# Patient Record
Sex: FEMALE | Race: WHITE | NOT HISPANIC OR LATINO | Employment: UNEMPLOYED | URBAN - METROPOLITAN AREA
[De-identification: names, ages, dates, MRNs, and addresses within clinical notes are randomized per-mention and may not be internally consistent; named-entity substitution may affect disease eponyms.]

---

## 2022-04-05 ENCOUNTER — APPOINTMENT (OUTPATIENT)
Dept: GENERAL RADIOLOGY | Facility: HOSPITAL | Age: 32
End: 2022-04-05

## 2022-04-05 ENCOUNTER — APPOINTMENT (OUTPATIENT)
Dept: CT IMAGING | Facility: HOSPITAL | Age: 32
End: 2022-04-05

## 2022-04-05 ENCOUNTER — HOSPITAL ENCOUNTER (EMERGENCY)
Facility: HOSPITAL | Age: 32
Discharge: HOME | End: 2022-04-05
Attending: STUDENT IN AN ORGANIZED HEALTH CARE EDUCATION/TRAINING PROGRAM | Admitting: STUDENT IN AN ORGANIZED HEALTH CARE EDUCATION/TRAINING PROGRAM

## 2022-04-05 VITALS
HEART RATE: 87 BPM | SYSTOLIC BLOOD PRESSURE: 139 MMHG | DIASTOLIC BLOOD PRESSURE: 86 MMHG | HEIGHT: 66 IN | BODY MASS INDEX: 40.18 KG/M2 | RESPIRATION RATE: 16 BRPM | TEMPERATURE: 98.7 F | WEIGHT: 250 LBS | OXYGEN SATURATION: 96 %

## 2022-04-05 DIAGNOSIS — M79.605 MUSCULOSKELETAL PAIN OF LEFT LOWER EXTREMITY: ICD-10-CM

## 2022-04-05 DIAGNOSIS — M25.552 HIP PAIN, ACUTE, LEFT: Primary | ICD-10-CM

## 2022-04-05 DIAGNOSIS — D72.829 LEUKOCYTOSIS, UNSPECIFIED TYPE: ICD-10-CM

## 2022-04-05 LAB
ALBUMIN SERPL-MCNC: 4.6 G/DL (ref 3.5–5.2)
ALBUMIN/GLOB SERPL: 1.5 G/DL
ALP SERPL-CCNC: 113 U/L (ref 39–117)
ALT SERPL W P-5'-P-CCNC: 25 U/L (ref 1–33)
ANION GAP SERPL CALCULATED.3IONS-SCNC: 13 MMOL/L (ref 5–15)
AST SERPL-CCNC: 27 U/L (ref 1–32)
BASOPHILS # BLD AUTO: 0.08 10*3/MM3 (ref 0–0.2)
BASOPHILS NFR BLD AUTO: 0.7 % (ref 0–1.5)
BILIRUB SERPL-MCNC: 0.4 MG/DL (ref 0–1.2)
BUN SERPL-MCNC: 14 MG/DL (ref 6–20)
BUN/CREAT SERPL: 16.7 (ref 7–25)
CALCIUM SPEC-SCNC: 9.6 MG/DL (ref 8.6–10.5)
CHLORIDE SERPL-SCNC: 103 MMOL/L (ref 98–107)
CO2 SERPL-SCNC: 24 MMOL/L (ref 22–29)
CREAT SERPL-MCNC: 0.84 MG/DL (ref 0.57–1)
CRP SERPL-MCNC: 0.63 MG/DL (ref 0–0.5)
DEPRECATED RDW RBC AUTO: 45 FL (ref 37–54)
EGFRCR SERPLBLD CKD-EPI 2021: 94.2 ML/MIN/1.73
EOSINOPHIL # BLD AUTO: 0.11 10*3/MM3 (ref 0–0.4)
EOSINOPHIL NFR BLD AUTO: 0.9 % (ref 0.3–6.2)
ERYTHROCYTE [DISTWIDTH] IN BLOOD BY AUTOMATED COUNT: 16.1 % (ref 12.3–15.4)
ERYTHROCYTE [SEDIMENTATION RATE] IN BLOOD: 42 MM/HR (ref 0–20)
GLOBULIN UR ELPH-MCNC: 3.1 GM/DL
GLUCOSE SERPL-MCNC: 97 MG/DL (ref 65–99)
HCT VFR BLD AUTO: 43 % (ref 34–46.6)
HGB BLD-MCNC: 13.5 G/DL (ref 12–15.9)
IMM GRANULOCYTES # BLD AUTO: 0.04 10*3/MM3 (ref 0–0.05)
IMM GRANULOCYTES NFR BLD AUTO: 0.3 % (ref 0–0.5)
LYMPHOCYTES # BLD AUTO: 2.07 10*3/MM3 (ref 0.7–3.1)
LYMPHOCYTES NFR BLD AUTO: 17.3 % (ref 19.6–45.3)
MCH RBC QN AUTO: 24.4 PG (ref 26.6–33)
MCHC RBC AUTO-ENTMCNC: 31.4 G/DL (ref 31.5–35.7)
MCV RBC AUTO: 77.6 FL (ref 79–97)
MONOCYTES # BLD AUTO: 0.9 10*3/MM3 (ref 0.1–0.9)
MONOCYTES NFR BLD AUTO: 7.5 % (ref 5–12)
NEUTROPHILS NFR BLD AUTO: 73.3 % (ref 42.7–76)
NEUTROPHILS NFR BLD AUTO: 8.8 10*3/MM3 (ref 1.7–7)
NRBC BLD AUTO-RTO: 0 /100 WBC (ref 0–0.2)
PLATELET # BLD AUTO: 486 10*3/MM3 (ref 140–450)
PMV BLD AUTO: 9.5 FL (ref 6–12)
POTASSIUM SERPL-SCNC: 3.8 MMOL/L (ref 3.5–5.2)
PROT SERPL-MCNC: 7.7 G/DL (ref 6–8.5)
RBC # BLD AUTO: 5.54 10*6/MM3 (ref 3.77–5.28)
SODIUM SERPL-SCNC: 140 MMOL/L (ref 136–145)
WBC NRBC COR # BLD: 12 10*3/MM3 (ref 3.4–10.8)

## 2022-04-05 PROCEDURE — 96375 TX/PRO/DX INJ NEW DRUG ADDON: CPT

## 2022-04-05 PROCEDURE — 96374 THER/PROPH/DIAG INJ IV PUSH: CPT

## 2022-04-05 PROCEDURE — 80053 COMPREHEN METABOLIC PANEL: CPT | Performed by: STUDENT IN AN ORGANIZED HEALTH CARE EDUCATION/TRAINING PROGRAM

## 2022-04-05 PROCEDURE — 73502 X-RAY EXAM HIP UNI 2-3 VIEWS: CPT

## 2022-04-05 PROCEDURE — 25010000002 HYDROMORPHONE PER 4 MG: Performed by: STUDENT IN AN ORGANIZED HEALTH CARE EDUCATION/TRAINING PROGRAM

## 2022-04-05 PROCEDURE — 85025 COMPLETE CBC W/AUTO DIFF WBC: CPT | Performed by: STUDENT IN AN ORGANIZED HEALTH CARE EDUCATION/TRAINING PROGRAM

## 2022-04-05 PROCEDURE — 96376 TX/PRO/DX INJ SAME DRUG ADON: CPT

## 2022-04-05 PROCEDURE — 72192 CT PELVIS W/O DYE: CPT

## 2022-04-05 PROCEDURE — 85652 RBC SED RATE AUTOMATED: CPT | Performed by: STUDENT IN AN ORGANIZED HEALTH CARE EDUCATION/TRAINING PROGRAM

## 2022-04-05 PROCEDURE — 25010000002 KETOROLAC TROMETHAMINE PER 15 MG: Performed by: STUDENT IN AN ORGANIZED HEALTH CARE EDUCATION/TRAINING PROGRAM

## 2022-04-05 PROCEDURE — 99284 EMERGENCY DEPT VISIT MOD MDM: CPT

## 2022-04-05 PROCEDURE — 86140 C-REACTIVE PROTEIN: CPT | Performed by: STUDENT IN AN ORGANIZED HEALTH CARE EDUCATION/TRAINING PROGRAM

## 2022-04-05 PROCEDURE — 99283 EMERGENCY DEPT VISIT LOW MDM: CPT

## 2022-04-05 RX ORDER — IBUPROFEN 600 MG/1
600 TABLET ORAL EVERY 6 HOURS PRN
Qty: 20 TABLET | Refills: 0 | Status: SHIPPED | OUTPATIENT
Start: 2022-04-05

## 2022-04-05 RX ORDER — METHOCARBAMOL 750 MG/1
750 TABLET, FILM COATED ORAL 3 TIMES DAILY
Qty: 9 TABLET | Refills: 0 | Status: SHIPPED | OUTPATIENT
Start: 2022-04-05 | End: 2022-04-08

## 2022-04-05 RX ORDER — KETOROLAC TROMETHAMINE 15 MG/ML
15 INJECTION, SOLUTION INTRAMUSCULAR; INTRAVENOUS ONCE
Status: COMPLETED | OUTPATIENT
Start: 2022-04-05 | End: 2022-04-05

## 2022-04-05 RX ORDER — ACETAMINOPHEN 500 MG
1000 TABLET ORAL ONCE
Status: COMPLETED | OUTPATIENT
Start: 2022-04-05 | End: 2022-04-05

## 2022-04-05 RX ORDER — OXYCODONE HYDROCHLORIDE 5 MG/1
5 TABLET ORAL EVERY 4 HOURS PRN
Qty: 6 TABLET | Refills: 0 | Status: SHIPPED | OUTPATIENT
Start: 2022-04-05

## 2022-04-05 RX ORDER — METHOCARBAMOL 750 MG/1
1500 TABLET, FILM COATED ORAL ONCE
Status: COMPLETED | OUTPATIENT
Start: 2022-04-05 | End: 2022-04-05

## 2022-04-05 RX ORDER — HYDROMORPHONE HYDROCHLORIDE 1 MG/ML
0.5 INJECTION, SOLUTION INTRAMUSCULAR; INTRAVENOUS; SUBCUTANEOUS ONCE
Status: COMPLETED | OUTPATIENT
Start: 2022-04-05 | End: 2022-04-05

## 2022-04-05 RX ADMIN — HYDROMORPHONE HYDROCHLORIDE 0.5 MG: 1 INJECTION, SOLUTION INTRAMUSCULAR; INTRAVENOUS; SUBCUTANEOUS at 01:57

## 2022-04-05 RX ADMIN — ACETAMINOPHEN 1000 MG: 500 TABLET ORAL at 01:58

## 2022-04-05 RX ADMIN — METHOCARBAMOL 1500 MG: 750 TABLET ORAL at 01:57

## 2022-04-05 RX ADMIN — KETOROLAC TROMETHAMINE 15 MG: 15 INJECTION, SOLUTION INTRAMUSCULAR; INTRAVENOUS at 06:41

## 2022-04-05 RX ADMIN — KETOROLAC TROMETHAMINE 15 MG: 15 INJECTION, SOLUTION INTRAMUSCULAR; INTRAVENOUS at 01:57

## 2022-04-05 RX ADMIN — HYDROMORPHONE HYDROCHLORIDE 0.5 MG: 1 INJECTION, SOLUTION INTRAMUSCULAR; INTRAVENOUS; SUBCUTANEOUS at 02:52

## 2022-04-05 NOTE — DISCHARGE INSTRUCTIONS
Take the provided anti-inflammatory medication and muscle relaxers.  Use the provided pain medication for severe breakthrough pain.  Avoid heavy lifting or straining but continue to maintain light activity.  Please return to the ED or seek other medical care for any fevers, significantly worsening pain, or any other concerning symptoms.

## 2022-04-08 NOTE — ED PROVIDER NOTES
EMERGENCY DEPARTMENT ENCOUNTER    Pt Name: Alysha Rodgers  MRN: [id number]  Pt :   1990  Room Number:    Date of encounter:  2022  PCP: Provider, No Known  ED Provider: Juve Tse MD    Historian: Patient      HPI:  Chief Complaint: Hip pain        Context: Alysha Rodgers is a 31 y.o. female who presents to the ED c/o severe, left, posterior pain for just over the past 24 hours.  She says the pain originates in her upper posterior hip and extends somewhat down her leg.  Pain is worse with movement or palpation.  She currently rates the pain as severe and says it is debilitating her pain makes it difficult for her to move.  She denies any trauma or straining that she knows of but thinks it may have been triggered by sitting on it awkwardly at work.  Denies any fevers or systemic symptoms.  Knows of nothing that makes the pain better and it has not improved with rest.  No other complaints at this time.      PAST MEDICAL HISTORY  No past medical history on file.      PAST SURGICAL HISTORY  No past surgical history on file.      FAMILY HISTORY  No family history on file.      SOCIAL HISTORY  Social History     Socioeconomic History   • Marital status: Single         ALLERGIES  Patient has no allergy information on record.        REVIEW OF SYSTEMS  Review of Systems       All systems reviewed and negative except for those discussed in HPI.       PHYSICAL EXAM    I have reviewed the triage vital signs and nursing notes.    ED Triage Vitals [22 0001]   Temp Heart Rate Resp BP SpO2   98.7 °F (37.1 °C) 87 16 111/74 99 %      Temp src Heart Rate Source Patient Position BP Location FiO2 (%)   Tympanic Monitor Sitting Right arm --       Physical Exam  GENERAL:   Appears in discomfort but no acute distress  HENT: Nares patent.  EYES: No scleral icterus.  CV: Well-perfused, no cyanosis and good cap refill  RESPIRATORY: Normal effort.  No audible wheezes, rales or rhonchi.  ABDOMEN: Soft,  nontender  MUSCULOSKELETAL: She has tenderness to palpation over the superior gluteal muscles and refuses active range of motion of the leg but only has moderate pain with passive range of motion including flexion, extension, internal and external rotation  NEURO: Alert, moves all extremities, follows commands.  SKIN: Warm, dry, no rash visualized.  No overlying erythema or edema over the painful hip.        LAB RESULTS  No results found for this or any previous visit (from the past 24 hour(s)).    If labs were ordered, I independently reviewed the results.        RADIOLOGY  No Radiology Exams Resulted Within Past 24 Hours    I ordered and reviewed the above noted radiographic studies.      I viewed images of x-ray of the left hip which does not reveal any acute abnormalities that I can appreciate and CT scan of the pelvis which does not reveal any unilateral joint effusion or asymmetry, no bony abnormalities, no soft tissue inflammation or injury per my evaluation.  See radiologist's dictation for official interpretation.        PROCEDURES    Procedures    No orders to display       MEDICATIONS GIVEN IN ER    Medications   ketorolac (TORADOL) injection 15 mg (15 mg Intravenous Given 4/5/22 0157)   HYDROmorphone (DILAUDID) injection 0.5 mg (0.5 mg Intravenous Given 4/5/22 0157)   acetaminophen (TYLENOL) tablet 1,000 mg (1,000 mg Oral Given 4/5/22 0158)   methocarbamol (ROBAXIN) tablet 1,500 mg (1,500 mg Oral Given 4/5/22 0157)   HYDROmorphone (DILAUDID) injection 0.5 mg (0.5 mg Intravenous Given 4/5/22 0252)   ketorolac (TORADOL) injection 15 mg (15 mg Intravenous Given 4/5/22 0641)         PROGRESS, DATA ANALYSIS, CONSULTS, AND MEDICAL DECISION MAKING    All labs have been independently reviewed by me.  All radiology studies have been reviewed by me and the radiologist dictating the report.   EKG's have been independently viewed and interpreted by me.      Differential diagnoses: Musculoskeletal injury, septic  arthritis, inflammatory arthritis, DVT, cellulitis, abscess           Patient arrived in obvious discomfort but no acute distress.  Physical exam reveals tenderness to palpation over the superior gluteal muscles but no significant tenderness with passive range of motion, no swelling, no erythema, no edema.  Treated pain with Toradol, Dilaudid, methocarbamol, acetaminophen.  She initially refuses x-ray saying it is just too painful to move but after further discussion is agreeable to at least getting an x-ray.  Labs concerning for possible inflammatory process with elevation in CRP and very mild elevations in white count and sed rate.  There is no effusion or fracture on x-ray but with the elevated inflammatory markers I discussed with Dr. Bailey with orthopedic surgery.  She is obese and joint aspiration will not be possible based on landmark guidance, and he recommends obtaining CT scan and if there is an effusion he recommends admission for IR joint aspiration but if there is no effusion he says she can safely discharge with diagnosis of musculoskeletal injury based on exam.  I think is a reasonable plan to obtain CT scan of the pelvis which does not reveal any asymmetric effusion or evidence of joint inflammation.  Medications are in at this point I think she can safely discharged with orthopedic surgery follow-up.  She is frustrated and tearful changing her story somewhat saying she has now been having the pain for 5 days and says she is just been laying in bed for the last 4 days.  I explained to her that immobilization is the worst thing that she can do for it and while I do want her to avoid heavy lifting or straining I need her to continue to move.  Will discharge with several pain medications to help with this and recommended stretching and light activity.  If her symptoms persist she will follow up with Dr. Bailey.  If she develops any fevers or systemic symptoms she will return to the emergency department  immediately.  Counseled on return precautions and verbally expressed understanding of these.      AS OF 06:21 EDT VITALS:    BP - 139/86  HR - 87  TEMP - 98.7 °F (37.1 °C) (Tympanic)  O2 SATS - 96%                  DIAGNOSIS  Final diagnoses:   Hip pain, acute, left   Leukocytosis, unspecified type   Musculoskeletal pain of left lower extremity         DISPOSITION  DISCHARGE    Patient discharged in stable condition.    Reviewed implications of results, diagnosis, meds, responsibility to follow up, warning signs and symptoms of possible worsening, potential complications and reasons to return to ER.    Patient/Family voiced understanding of above instructions.    Discussed plan for discharge, as there is no emergent indication for admission.  Pt/family is agreeable and understands need for follow up and possible repeat testing.  Pt/family is aware that discharge does not mean that nothing is wrong but that it indicates no emergency is currently present that requires admission and they must continue care with follow-up as given below or with a physician of their choice.     FOLLOW-UP  PATIENT CONNECTION - [place] [address]  [phone]  Call   To establish with a PCP.    Jono Bailey MD  [address]  [address]  [place] [address]  [phone]    Call   For orthopedic surgery evaluation if symptoms persist         Medication List      New Prescriptions    ibuprofen 600 MG tablet  Commonly known as: ADVIL,MOTRIN  Take 1 tablet by mouth Every 6 (Six) Hours As Needed for Mild Pain .     methocarbamol 750 MG tablet  Commonly known as: ROBAXIN  Take 1 tablet by mouth 3 (Three) Times a Day for 3 days.     oxyCODONE 5 MG immediate release tablet  Commonly known as: Roxicodone  Take 1 tablet by mouth Every 4 (Four) Hours As Needed for Severe Pain .           Where to Get Your Medications      These medications were sent to [facility] - [place], [place] - [address] AT [facility]  [facility] - [phone] [facility] [phone] FX  [address] [address]    Phone: [phone]   · ibuprofen 600 MG tablet  · methocarbamol 750 MG tablet  · oxyCODONE 5 MG immediate release tablet                    Juve Tse MD  04/08/22 0612